# Patient Record
Sex: MALE | Race: OTHER | HISPANIC OR LATINO | URBAN - METROPOLITAN AREA
[De-identification: names, ages, dates, MRNs, and addresses within clinical notes are randomized per-mention and may not be internally consistent; named-entity substitution may affect disease eponyms.]

---

## 2020-06-02 ENCOUNTER — EMERGENCY (EMERGENCY)
Facility: HOSPITAL | Age: 55
LOS: 1 days | Discharge: HOME | End: 2020-06-02
Attending: EMERGENCY MEDICINE
Payer: MEDICAID

## 2020-06-02 VITALS
TEMPERATURE: 98 F | SYSTOLIC BLOOD PRESSURE: 87 MMHG | HEART RATE: 64 BPM | HEIGHT: 63 IN | WEIGHT: 134.04 LBS | DIASTOLIC BLOOD PRESSURE: 50 MMHG | OXYGEN SATURATION: 100 % | RESPIRATION RATE: 14 BRPM

## 2020-06-02 VITALS
OXYGEN SATURATION: 99 % | RESPIRATION RATE: 18 BRPM | HEART RATE: 88 BPM | DIASTOLIC BLOOD PRESSURE: 98 MMHG | TEMPERATURE: 98 F | SYSTOLIC BLOOD PRESSURE: 135 MMHG

## 2020-06-02 LAB
ALBUMIN SERPL ELPH-MCNC: 2.9 G/DL — LOW (ref 3.5–5)
ALP SERPL-CCNC: 90 U/L — SIGNIFICANT CHANGE UP (ref 40–120)
ALT FLD-CCNC: 86 U/L DA — HIGH (ref 10–60)
ANION GAP SERPL CALC-SCNC: 8 MMOL/L — SIGNIFICANT CHANGE UP (ref 5–17)
APAP SERPL-MCNC: <2 UG/ML — LOW (ref 10–30)
APPEARANCE UR: CLEAR — SIGNIFICANT CHANGE UP
APTT BLD: 27.6 SEC — SIGNIFICANT CHANGE UP (ref 27.5–36.3)
AST SERPL-CCNC: 80 U/L — HIGH (ref 10–40)
BASOPHILS # BLD AUTO: 0.03 K/UL — SIGNIFICANT CHANGE UP (ref 0–0.2)
BASOPHILS NFR BLD AUTO: 0.5 % — SIGNIFICANT CHANGE UP (ref 0–2)
BILIRUB SERPL-MCNC: 0.6 MG/DL — SIGNIFICANT CHANGE UP (ref 0.2–1.2)
BILIRUB UR-MCNC: NEGATIVE — SIGNIFICANT CHANGE UP
BUN SERPL-MCNC: 17 MG/DL — SIGNIFICANT CHANGE UP (ref 7–18)
CALCIUM SERPL-MCNC: 7.7 MG/DL — LOW (ref 8.4–10.5)
CHLORIDE SERPL-SCNC: 107 MMOL/L — SIGNIFICANT CHANGE UP (ref 96–108)
CK SERPL-CCNC: 79 U/L — SIGNIFICANT CHANGE UP (ref 35–232)
CO2 SERPL-SCNC: 25 MMOL/L — SIGNIFICANT CHANGE UP (ref 22–31)
COLOR SPEC: YELLOW — SIGNIFICANT CHANGE UP
CREAT SERPL-MCNC: 2.05 MG/DL — HIGH (ref 0.5–1.3)
DIFF PNL FLD: NEGATIVE — SIGNIFICANT CHANGE UP
EOSINOPHIL # BLD AUTO: 0.03 K/UL — SIGNIFICANT CHANGE UP (ref 0–0.5)
EOSINOPHIL NFR BLD AUTO: 0.5 % — SIGNIFICANT CHANGE UP (ref 0–6)
ETHANOL SERPL-MCNC: <3 MG/DL — SIGNIFICANT CHANGE UP (ref 0–10)
GLUCOSE SERPL-MCNC: 109 MG/DL — HIGH (ref 70–99)
GLUCOSE UR QL: NEGATIVE — SIGNIFICANT CHANGE UP
HCT VFR BLD CALC: 32.4 % — LOW (ref 39–50)
HGB BLD-MCNC: 10.6 G/DL — LOW (ref 13–17)
IMM GRANULOCYTES NFR BLD AUTO: 0.5 % — SIGNIFICANT CHANGE UP (ref 0–1.5)
INR BLD: 1.03 RATIO — SIGNIFICANT CHANGE UP (ref 0.88–1.16)
KETONES UR-MCNC: NEGATIVE — SIGNIFICANT CHANGE UP
LEUKOCYTE ESTERASE UR-ACNC: NEGATIVE — SIGNIFICANT CHANGE UP
LYMPHOCYTES # BLD AUTO: 1.29 K/UL — SIGNIFICANT CHANGE UP (ref 1–3.3)
LYMPHOCYTES # BLD AUTO: 19.7 % — SIGNIFICANT CHANGE UP (ref 13–44)
MCHC RBC-ENTMCNC: 31.5 PG — SIGNIFICANT CHANGE UP (ref 27–34)
MCHC RBC-ENTMCNC: 32.7 GM/DL — SIGNIFICANT CHANGE UP (ref 32–36)
MCV RBC AUTO: 96.1 FL — SIGNIFICANT CHANGE UP (ref 80–100)
MONOCYTES # BLD AUTO: 1.04 K/UL — HIGH (ref 0–0.9)
MONOCYTES NFR BLD AUTO: 15.9 % — HIGH (ref 2–14)
NEUTROPHILS # BLD AUTO: 4.14 K/UL — SIGNIFICANT CHANGE UP (ref 1.8–7.4)
NEUTROPHILS NFR BLD AUTO: 62.9 % — SIGNIFICANT CHANGE UP (ref 43–77)
NITRITE UR-MCNC: NEGATIVE — SIGNIFICANT CHANGE UP
NRBC # BLD: 0 /100 WBCS — SIGNIFICANT CHANGE UP (ref 0–0)
PCP SPEC-MCNC: SIGNIFICANT CHANGE UP
PH UR: 7 — SIGNIFICANT CHANGE UP (ref 5–8)
PLATELET # BLD AUTO: 263 K/UL — SIGNIFICANT CHANGE UP (ref 150–400)
POTASSIUM SERPL-MCNC: 4.7 MMOL/L — SIGNIFICANT CHANGE UP (ref 3.5–5.3)
POTASSIUM SERPL-SCNC: 4.7 MMOL/L — SIGNIFICANT CHANGE UP (ref 3.5–5.3)
PROT SERPL-MCNC: 6.1 G/DL — SIGNIFICANT CHANGE UP (ref 6–8.3)
PROT UR-MCNC: NEGATIVE — SIGNIFICANT CHANGE UP
PROTHROM AB SERPL-ACNC: 11.6 SEC — SIGNIFICANT CHANGE UP (ref 10–12.9)
RBC # BLD: 3.37 M/UL — LOW (ref 4.2–5.8)
RBC # FLD: 15.7 % — HIGH (ref 10.3–14.5)
SALICYLATES SERPL-MCNC: <1.7 MG/DL — LOW (ref 2.8–20)
SODIUM SERPL-SCNC: 140 MMOL/L — SIGNIFICANT CHANGE UP (ref 135–145)
SP GR SPEC: 1.01 — SIGNIFICANT CHANGE UP (ref 1.01–1.02)
TROPONIN I SERPL-MCNC: <0.015 NG/ML — SIGNIFICANT CHANGE UP (ref 0–0.04)
UROBILINOGEN FLD QL: NEGATIVE — SIGNIFICANT CHANGE UP
WBC # BLD: 6.56 K/UL — SIGNIFICANT CHANGE UP (ref 3.8–10.5)
WBC # FLD AUTO: 6.56 K/UL — SIGNIFICANT CHANGE UP (ref 3.8–10.5)

## 2020-06-02 PROCEDURE — 85610 PROTHROMBIN TIME: CPT

## 2020-06-02 PROCEDURE — 80053 COMPREHEN METABOLIC PANEL: CPT

## 2020-06-02 PROCEDURE — 84484 ASSAY OF TROPONIN QUANT: CPT

## 2020-06-02 PROCEDURE — 93005 ELECTROCARDIOGRAM TRACING: CPT

## 2020-06-02 PROCEDURE — 82550 ASSAY OF CK (CPK): CPT

## 2020-06-02 PROCEDURE — 36415 COLL VENOUS BLD VENIPUNCTURE: CPT

## 2020-06-02 PROCEDURE — 99285 EMERGENCY DEPT VISIT HI MDM: CPT | Mod: 25

## 2020-06-02 PROCEDURE — 81003 URINALYSIS AUTO W/O SCOPE: CPT

## 2020-06-02 PROCEDURE — 80307 DRUG TEST PRSMV CHEM ANLYZR: CPT

## 2020-06-02 PROCEDURE — 85730 THROMBOPLASTIN TIME PARTIAL: CPT

## 2020-06-02 PROCEDURE — 82962 GLUCOSE BLOOD TEST: CPT

## 2020-06-02 PROCEDURE — 71045 X-RAY EXAM CHEST 1 VIEW: CPT | Mod: 26

## 2020-06-02 PROCEDURE — 85027 COMPLETE CBC AUTOMATED: CPT

## 2020-06-02 PROCEDURE — 71045 X-RAY EXAM CHEST 1 VIEW: CPT

## 2020-06-02 PROCEDURE — 99283 EMERGENCY DEPT VISIT LOW MDM: CPT

## 2020-06-02 RX ORDER — SODIUM CHLORIDE 9 MG/ML
1000 INJECTION INTRAMUSCULAR; INTRAVENOUS; SUBCUTANEOUS ONCE
Refills: 0 | Status: COMPLETED | OUTPATIENT
Start: 2020-06-02 | End: 2020-06-02

## 2020-06-02 RX ADMIN — SODIUM CHLORIDE 1000 MILLILITER(S): 9 INJECTION INTRAMUSCULAR; INTRAVENOUS; SUBCUTANEOUS at 20:03

## 2020-06-02 NOTE — ED PROVIDER NOTE - CLINICAL SUMMARY MEDICAL DECISION MAKING FREE TEXT BOX
patient with transient unresponsiveness, awoke with narcan Patient with transient unresponsiveness, awoke with narcan.

## 2020-06-02 NOTE — ED PROVIDER NOTE - OBJECTIVE STATEMENT
Patient here for unresponsiveness. He was found on his couch unresponsive by wife. He had a number of pill bottles around him. EMS have 2 mg narcan and patient woke up. BP was noted to be low. EMS gave saline and brought patient to Emergency Department. medications obtained at scene (dosage not available)  oxycodone, hydrocodone, topamas, pepcid, enalapril, meclizine, gabapenting flexeril, tizanidine, tramadol, colchicine. Patient states he only took his usual medication today, one oxy and one hydrocodone in am, then a motrin in afternoon. took only 1 enalapril tab in am. He recalls watching TV with his dog, denies feeling dizzy or any complaints. He has chronic back pain for which he is prescribed medication. last alcohol use 4 days ago on saturday. Denies suicidal attempt or ideation or desire to harm self.

## 2020-06-02 NOTE — ED PROVIDER NOTE - PROGRESS NOTE DETAILS
patient feeling well. BP normalized. Patient requesting discharge. I spoke with the wife who said patient has had back surgery 2 years ago after fall off scaffolding. He has medication for pain which he takes as directed. She does not think he attempted to cause self harm. He also suffers from insomnia. She said she came home and found him asleep on couch and could not wake him up and became concerned and called 911. I advised patient to not mix the various pain killers as they can cause respiratory depression. Patient verbalized understanding

## 2020-06-02 NOTE — ED PROVIDER NOTE - PATIENT PORTAL LINK FT
You can access the FollowMyHealth Patient Portal offered by [facility] by registering at the following website: [email or web address]. By joining [employer]’s FollowMyHealth portal, you will also be able to view your health information using other applications (apps) compatible with our system.

## 2020-06-02 NOTE — ED ADULT NURSE NOTE - OBJECTIVE STATEMENT
The patient BIBEMS for unresponsiveness at home in his living room next to pill bottles. The patient is now aaox3, states that he drank many of his prescribed pills such as Gabapentin, Topamas, oxycodone.  He said that he was in pain and took his pills to help with the pain.  He denies SI/HI.  He is hypotensive on arrival.  EMS administered bolus NS.  He verbalized feeling great.

## 2020-06-02 NOTE — ED ADULT TRIAGE NOTE - CHIEF COMPLAINT QUOTE
Pt found unresponsive by wife, possible OD, Narcan given by EMS. Pt aox3 on arrival, talking in full sentences, no respiratory distress noted.

## 2022-05-18 NOTE — ED ADULT NURSE NOTE - CAS DISCH TRANSFER METHOD
If the provider orders tests at today's visit, the patient would like to be contacted via phone at [phone] and it is OK to leave detailed message on voice mail or with family member.      If medications are ordered at today's visit, the pharmacy name/location patient would like them to be sent to is       
NPT    History: 87 year old female presents for milia and melasma, both getting worse.    PE: Focused skin exam significant for: confluent, hyperpigmented macules and patches with well-defined borders distributed symmetrically on the periorbital bilaterally; multiple superficial, uniform, pearly white to yellowish, dome-shaped papules located on the face    A/P:  1. Melasma  - chronic problem with exacerbation/progression requiring prescription drug management  - rx given for compounded hydroquinone--tretinoin--fluocinonide cream  - recommend daily use of sunscreen with an SPF of 30 or greater    2. Skin cysts  - acute, uncomplicated problem with low risk of morbidity from treatment  - ddx: milia vs other  - performed acne surgery to extract cysts (see procedure note below)    PROCEDURE NOTE: Acne surgery to extract cysts  DIAGNOSIS: Skin cysts  LOCATION(S): face    After discussing the risks, the procedure was consented to.  The surgical areas were cleaned with alcohol.  Using a [employer] comedone extractor the cysts were opened, followed by expression of the cystic contents.  Aftercare was discussed.  Patient tolerated procedure well.    RTC: prn    Note: This encounter has more than one diagnosis code and only a subset of them are associated with a minor procedure code.  This makes today’s evaluation and management service significant and separately identifiable and reportable with a modifier 25.    References:  [1] https://cdn.[employer].com/files/l1aw-scmmip/issues/articles/RL749942835.pdf  [2] https://cdn.[employer].com/files/o6es-rrnidb/issues/articles/si806872448.pdf    Electronically Signed by: Juan C Yun MD, 5/18/2022   
Transportation service

## 2022-07-20 NOTE — ED PROVIDER NOTE - RESPIRATORY, MLM
Pt c/o cough, chest congestion and positive home covid test.
Breath sounds clear and equal bilaterally.

## 2023-02-14 NOTE — ED ADULT TRIAGE NOTE - SOURCE OF INFORMATION
Impression: DRY AMD OS - ERM peel '19 OS Plan:  REPEATED EXAM shows AMD DRY OS -- MODERATE -- Mod AMD - Remind: AREDS2, diet, [place], non-smoking urged pt. Prior VIT peel OS Jan '19.
Impression: WET AMD w CNV OD - Inj x Aug '18. LOST to f/u '19-20 (ordered come back 8w Jan '19 -- did not - re-[facility] '20 w SCAR limiting now) Plan: hx: [[CNV OD inj -was NO recur. Monitor caution OD - THEN LOST f/u '19-20 by pt - RECUR [facility] / SCAR Jan '20 back to care - progn. limited - Lagged response d/t SCAR limits OD - understood]]. TODAY Avastin OD (proc note). Extend - SCAR limits RTC   10-12w dil / plan HRA update / Loan Guero  OD d/t RECUR     Future ND??
 NOTE:   Rvw'd FDA-apprv'd on-label biosimilar BYOOVIZ. Prefer to non-FDA-apprv'd off-label Avastin. Biosimilar to ranibizimab injx. D/w'd pt r/b/a and options. Pt expressed understanding desires proceed. On-label FDA-approved drug, yet remains theoretical low (but not zero) risk adverse events -Understood.
Spouse/Patient

## 2024-04-11 NOTE — ED PROVIDER NOTE - DATE/TIME 1
SW confirmed that patient received RW at the bedside. No additional post-acute needs.              DEVORA Betancur, LMSW  [facility]  Case Management  Ext. [phone]    02-Jun-2020 23:10

## 2024-08-10 NOTE — ED ADULT TRIAGE NOTE - TEMPERATURE IN CELSIUS (DEGREES C)
Chief Complaint   Patient presents with    Injury     Stubbed LH2D on ground 3 days ago.  Finger is swollen, discolored, unable to straighten.        Patient escorted to exam room with Mother.  Patient in Room 3.  Patient's name,  and allergies verified.   36.7